# Patient Record
Sex: MALE
[De-identification: names, ages, dates, MRNs, and addresses within clinical notes are randomized per-mention and may not be internally consistent; named-entity substitution may affect disease eponyms.]

---

## 2024-04-18 ENCOUNTER — APPOINTMENT (OUTPATIENT)
Dept: UROLOGY | Facility: CLINIC | Age: 44
End: 2024-04-18
Payer: COMMERCIAL

## 2024-04-18 VITALS
RESPIRATION RATE: 16 BRPM | TEMPERATURE: 97.7 F | DIASTOLIC BLOOD PRESSURE: 79 MMHG | HEART RATE: 61 BPM | SYSTOLIC BLOOD PRESSURE: 120 MMHG | OXYGEN SATURATION: 99 %

## 2024-04-18 DIAGNOSIS — Z78.9 OTHER SPECIFIED HEALTH STATUS: ICD-10-CM

## 2024-04-18 DIAGNOSIS — Z84.1 FAMILY HISTORY OF DISORDERS OF KIDNEY AND URETER: ICD-10-CM

## 2024-04-18 LAB
BILIRUB UR QL STRIP: NEGATIVE
GLUCOSE UR-MCNC: NEGATIVE
HCG UR QL: 0.2 EU/DL
HGB UR QL STRIP.AUTO: ABNORMAL
KETONES UR-MCNC: NEGATIVE
LEUKOCYTE ESTERASE UR QL STRIP: NEGATIVE
NITRITE UR QL STRIP: NEGATIVE
PH UR STRIP: 5.5
PROT UR STRIP-MCNC: NEGATIVE
SP GR UR STRIP: >=1.03

## 2024-04-18 PROCEDURE — 81003 URINALYSIS AUTO W/O SCOPE: CPT | Mod: QW

## 2024-04-18 PROCEDURE — 99203 OFFICE O/P NEW LOW 30 MIN: CPT

## 2024-04-18 PROCEDURE — 51798 US URINE CAPACITY MEASURE: CPT

## 2024-04-18 RX ORDER — ROSUVASTATIN CALCIUM 5 MG/1
TABLET, FILM COATED ORAL
Refills: 0 | Status: ACTIVE | COMMUNITY

## 2024-04-18 RX ORDER — ALLOPURINOL 200 MG/1
TABLET ORAL
Refills: 0 | Status: ACTIVE | COMMUNITY

## 2024-04-18 NOTE — HISTORY OF PRESENT ILLNESS
[FreeTextEntry1] : 43M presents for initial evaluation of abdominal pain   Referred by Dr. Reynoso (GI)  PMH significant for: HLD PSH significant for: nothing Significant meds: allopurinol, rosuvastatin  Reports week long history of lower abd pain wrapping around front of body and intermittent testicle pain that alternates between testicles  Reports having 2 episodes of kidney stones in the past, most recently 5 years ago, stones passed on their own Had GI workup for abd pain including endoscopy and CT scan  CT 4/5: no acute finding in abdomen or pelvis, hepatomegaly, hepatic steatosis, bilateral nonobstructing nephrolithiasis, measuring up to 6mm in left kidney  Also reports mild straining to urinate and sensation of obstruction when starting urination  Denies dysuria or hematuria  PVR 4cc

## 2024-04-18 NOTE — ASSESSMENT
[FreeTextEntry1] :  Mr. King presents for initial evaluation of lower abdominal pain that radiates periodically to his testicles bilaterally. Notable history of nephrolithiasis, previously passed 3 stones Outside CT report shows bilateral nonobstructing intrarenal calculi (largest 6 mm) in the absence of hydro or stranding His exam is within normal limits.  UA: Trace hemolyzed blood, PVR: Less than 50 cc  The source of his pain is likely not related to any genitourinary pathology.  No intervention required at this time.   Recommendations -Urine culture -Follow-up with PCP for other sources

## 2024-04-18 NOTE — PHYSICAL EXAM
[General Appearance - Well Developed] : well developed [General Appearance - Well Nourished] : well nourished [Abdomen Soft] : soft [Abdomen Tenderness] : non-tender [Penis Abnormality] : normal uncircumcised penis [Testes Tenderness] : no tenderness of the testes [Testes Mass (___cm)] : there were no testicular masses

## 2024-04-22 ENCOUNTER — NON-APPOINTMENT (OUTPATIENT)
Age: 44
End: 2024-04-22

## 2024-04-22 LAB — BACTERIA UR CULT: NORMAL

## 2024-05-02 ENCOUNTER — APPOINTMENT (OUTPATIENT)
Dept: GASTROENTEROLOGY | Facility: CLINIC | Age: 44
End: 2024-05-02
Payer: COMMERCIAL

## 2024-05-02 VITALS
OXYGEN SATURATION: 98 % | DIASTOLIC BLOOD PRESSURE: 80 MMHG | WEIGHT: 209 LBS | BODY MASS INDEX: 30.96 KG/M2 | HEIGHT: 69 IN | SYSTOLIC BLOOD PRESSURE: 120 MMHG | HEART RATE: 72 BPM

## 2024-05-02 DIAGNOSIS — R10.30 LOWER ABDOMINAL PAIN, UNSPECIFIED: ICD-10-CM

## 2024-05-02 PROCEDURE — 99204 OFFICE O/P NEW MOD 45 MIN: CPT

## 2024-05-02 RX ORDER — SODIUM SULFATE, POTASSIUM SULFATE AND MAGNESIUM SULFATE 1.6; 3.13; 17.5 G/177ML; G/177ML; G/177ML
17.5-3.13-1.6 SOLUTION ORAL
Qty: 1 | Refills: 0 | Status: ACTIVE | COMMUNITY
Start: 2024-05-02 | End: 1900-01-01

## 2024-05-02 NOTE — HISTORY OF PRESENT ILLNESS
[FreeTextEntry1] : 43-year-old male Months of intermittent abdominal pain. Sometimes associate with nausea. Pain episodes occurring in the middle of the night, awakening from sleep Lasting an hour to an hour and a half Patient typically will get up to try and move his bowels and pass gas Will usually do so, with some slow relief Denies any pain associated with eating Stools during the middle of night tend to be soft, pasty Does move his bowels every day, though typically several times each morning Denies any sense of incomplete evacuation Denies any bleeding No family history of colon cancer  Recent workup with prior gastroenterologist Dr. Reynoso Unremarkable upper endoscopy CAT scan of the abdomen pelvis with intravenous and oral contrast (see scanned document) showing kidney stone, but no acute GI pathology Stool testing unavailable  Social history: Patient is an

## 2024-05-02 NOTE — PHYSICAL EXAM
[Normal] : alert, normal voice/communication, healthy appearing, no acute distress [Oriented To Time, Place, And Person] : oriented to person, place, and time [de-identified] : Mild left-sided tenderness without rebound or guarding [de-identified] : Very pleasant affect

## 2024-05-02 NOTE — ASSESSMENT
[FreeTextEntry1] : Likely functional bowel complaints Unable to view CAT scan of the abdomen and pelvis, however some suspicion for fecal overload, gas pains Doubt but cannot rule out neoplasm Plan Daily fiber supplement recommended Asked patient to retrieve the disc from his CAT scan of the abdomen pelvis, bring to a [facility] radiology office to be downloaded for further review Also, indications risks benefits and alternatives to colonoscopy reviewed.  Patient agreeable.

## 2024-05-06 ENCOUNTER — APPOINTMENT (OUTPATIENT)
Dept: GASTROENTEROLOGY | Facility: CLINIC | Age: 44
End: 2024-05-06

## 2024-06-04 ENCOUNTER — APPOINTMENT (OUTPATIENT)
Dept: GASTROENTEROLOGY | Facility: CLINIC | Age: 44
End: 2024-06-04

## 2024-06-05 ENCOUNTER — APPOINTMENT (OUTPATIENT)
Dept: GASTROENTEROLOGY | Facility: AMBULATORY MEDICAL SERVICES | Age: 44
End: 2024-06-05
Payer: COMMERCIAL

## 2024-06-05 PROCEDURE — 45385 COLONOSCOPY W/LESION REMOVAL: CPT | Mod: 33

## 2024-06-05 PROCEDURE — 45380 COLONOSCOPY AND BIOPSY: CPT | Mod: 33,59

## 2024-06-07 ENCOUNTER — TRANSCRIPTION ENCOUNTER (OUTPATIENT)
Age: 44
End: 2024-06-07

## 2024-07-05 ENCOUNTER — NON-APPOINTMENT (OUTPATIENT)
Age: 44
End: 2024-07-05

## 2024-09-27 ENCOUNTER — APPOINTMENT (OUTPATIENT)
Dept: OTOLARYNGOLOGY | Facility: CLINIC | Age: 44
End: 2024-09-27
Payer: COMMERCIAL

## 2024-09-27 ENCOUNTER — NON-APPOINTMENT (OUTPATIENT)
Age: 44
End: 2024-09-27

## 2024-09-27 VITALS
SYSTOLIC BLOOD PRESSURE: 111 MMHG | HEIGHT: 69 IN | HEART RATE: 57 BPM | WEIGHT: 216 LBS | DIASTOLIC BLOOD PRESSURE: 75 MMHG | BODY MASS INDEX: 31.99 KG/M2

## 2024-09-27 DIAGNOSIS — Z83.3 FAMILY HISTORY OF DIABETES MELLITUS: ICD-10-CM

## 2024-09-27 DIAGNOSIS — H60.549 ACUTE ECZEMATOID OTITIS EXTERNA, UNSPECIFIED EAR: ICD-10-CM

## 2024-09-27 DIAGNOSIS — H61.20 IMPACTED CERUMEN, UNSPECIFIED EAR: ICD-10-CM

## 2024-09-27 DIAGNOSIS — Z82.49 FAMILY HISTORY OF ISCHEMIC HEART DISEASE AND OTHER DISEASES OF THE CIRCULATORY SYSTEM: ICD-10-CM

## 2024-09-27 PROCEDURE — 69210 REMOVE IMPACTED EAR WAX UNI: CPT

## 2024-09-27 PROCEDURE — 99203 OFFICE O/P NEW LOW 30 MIN: CPT | Mod: 25

## 2024-09-27 RX ORDER — FLUOCINOLONE ACETONIDE 0.11 MG/ML
0.01 OIL AURICULAR (OTIC)
Qty: 1 | Refills: 2 | Status: ACTIVE | COMMUNITY
Start: 2024-09-27 | End: 1900-01-01

## 2024-09-27 NOTE — ASSESSMENT
[FreeTextEntry1] : NEOSPORIN PRN LEFT RETROLOBULE AREA/ OBSERVATION AT PRESENT/ IF ANY CHANGES TO BE REXAMINED AVOID Q TIPS FLUOCINOLON PRN F/U PRN

## 2024-09-27 NOTE — REVIEW OF SYSTEMS
[Sneezing] : sneezing [Seasonal Allergies] : seasonal allergies [Ear Pain] : ear pain [Problem Snoring] : problem snoring [Throat Clearing] : throat clearing [Heartburn] : heartburn [Negative] : Heme/Lymph [Patient Intake Form Reviewed] : Patient intake form was reviewed

## 2024-09-27 NOTE — PHYSICAL EXAM
[de-identified] : MARLYN CERUMEN REMOVED/ CANAL ECZEMA/ LEFT SMALL 5 MM SUBCUTANEOUS NODULE/ NON TENDER [Normal] : mucosa is normal [Midline] : trachea located in midline position

## 2024-09-27 NOTE — REASON FOR VISIT
[Subsequent Evaluation] : a subsequent evaluation for [FreeTextEntry2] : Ear wax removal/ itching and bump on back of ear lobe- fluid